# Patient Record
(demographics unavailable — no encounter records)

---

## 2024-10-10 NOTE — HISTORY OF PRESENT ILLNESS
[FreeTextEntry1] : WHITNEY QUIROGA is a 58 yo woman with PMH of pancreatic adenocarcinoma (diagnosed in 05/2023 and stopped chemo 3 months, at [facility]) and opioid use disorder on methadone (15 mg qd)  who presents for initial palliative consultation. Patient is accompanied by her daughter who provided collateral history.   Hospital Course ([facility] 9/20/24-10/3/24): -presented with abdominal distention and pain for the past few weeks -The patient primarily received her cancer care at [facility], where she was recently admitted for transudative ascites s/p LVP, NGT placement and GI stenting for gastric outlet obstruction. The patient left AMA after discomfort with the NGT and wanted to be closer to family in [place].  -Pt admitted with abdominal pain 2/2 pancreatic ca -CT abd showed large ascites and pancreatic mass, s/p paracentesis on 9/23 w/ 4.8 L removed -On 9/27, pleurx cath placed -Pt is confirmed DNR/DNI -Has R chest port that was accessed during admission, deaccessed on 10/3. Pt planning for DC home with A and home hospice  Today patient states that abdominal pain is severe and rates it as 8/10. States that pain is constant and that pain medications do not provide relief. Currently on methadone 15mg qd (for OUD, follows with methadone clinic) and morphine 7.5mg q4hs prn. Takes this about 3 times per day but states that it does not relieve pain. The only current alleviating factor is when she drains pleurX catheter. States that this helps to relieve some pressure. Currently draining ~ 700ml once daily.   Patient does have some nausea. States that she has been taking zofran 4 mg q8hrs prn with relief. No recent vomiting episodes. Very poor appetite.   Patient previously prescribed Klonopin. She is no longer taking this. Currently ordered for hydroxizine. Daughter feels that anxiety has been stable. She is having regular BMs on senna and miralax.  Patient is currently taking mirtazapine 15 mg in the morning and trazodone 50mg qhs. Also occasionally taking melatonin.   Patient has pancreatic cancer with malignant ascites and is no longer being offered treatment. Transitioning to hospice was discussed during her hospitalization but daughter is hesitant about this because of HHA coverage. Daughter is an NP at [facility] and feels capable in providing patient with adequate care at home with the help of HHAs. States that goal is to use palliative services to help with symptom management and if/when patient starts to transition, she will pursue admission to the PCU at [facility].

## 2024-10-10 NOTE — HISTORY OF PRESENT ILLNESS
[FreeTextEntry1] : WHITNEY QUIROGA is a 60 yo woman with PMH of pancreatic adenocarcinoma (diagnosed in 05/2023 and stopped chemo 3 months, at [facility]) and opioid use disorder on methadone (15 mg qd)  who presents for initial palliative consultation. Patient is accompanied by her daughter who provided collateral history.   Hospital Course ([facility] 9/20/24-10/3/24): -presented with abdominal distention and pain for the past few weeks -The patient primarily received her cancer care at [facility], where she was recently admitted for transudative ascites s/p LVP, NGT placement and GI stenting for gastric outlet obstruction. The patient left AMA after discomfort with the NGT and wanted to be closer to family in [place].  -Pt admitted with abdominal pain 2/2 pancreatic ca -CT abd showed large ascites and pancreatic mass, s/p paracentesis on 9/23 w/ 4.8 L removed -On 9/27, pleurx cath placed -Pt is confirmed DNR/DNI -Has R chest port that was accessed during admission, deaccessed on 10/3. Pt planning for DC home with A and home hospice  Today patient states that abdominal pain is severe and rates it as 8/10. States that pain is constant and that pain medications do not provide relief. Currently on methadone 15mg qd (for OUD, follows with methadone clinic) and morphine 7.5mg q4hs prn. Takes this about 3 times per day but states that it does not relieve pain. The only current alleviating factor is when she drains pleurX catheter. States that this helps to relieve some pressure. Currently draining ~ 700ml once daily.   Patient does have some nausea. States that she has been taking zofran 4 mg q8hrs prn with relief. No recent vomiting episodes. Very poor appetite.   Patient previously prescribed Klonopin. She is no longer taking this. Currently ordered for hydroxizine. Daughter feels that anxiety has been stable. She is having regular BMs on senna and miralax.  Patient is currently taking mirtazapine 15 mg in the morning and trazodone 50mg qhs. Also occasionally taking melatonin.   Patient has pancreatic cancer with malignant ascites and is no longer being offered treatment. Transitioning to hospice was discussed during her hospitalization but daughter is hesitant about this because of HHA coverage. Daughter is an NP at [facility] and feels capable in providing patient with adequate care at home with the help of HHAs. States that goal is to use palliative services to help with symptom management and if/when patient starts to transition, she will pursue admission to the PCU at [facility].

## 2024-10-10 NOTE — PHYSICAL EXAM
[] : no respiratory distress [Respiration, Rhythm And Depth] : normal respiratory rhythm and effort [Exaggerated Use Of Accessory Muscles For Inspiration] : no accessory muscle use [Auscultation Breath Sounds / Voice Sounds] : lungs were clear to auscultation bilaterally [Apical Impulse] : the apical impulse was normal [Heart Rate And Rhythm] : heart rate was normal and rhythm regular [Heart Sounds] : normal S1 and S2 [Heart Sounds Gallop] : no gallops [Murmurs] : no murmurs [Heart Sounds Pericardial Friction Rub] : no pericardial rub [Abdomen Soft] : soft [Abdomen Tenderness] : non-tender [No Focal Deficits] : no focal deficits [Sclera] : the sclera and conjunctiva were normal [Outer Ear] : the ears and nose were normal in appearance [Edema] : there was no peripheral edema [FreeTextEntry1] : appears sad, uncomfortable

## 2024-10-10 NOTE — REVIEW OF SYSTEMS
[Feeling Poorly] : feeling poorly [Feeling Tired] : feeling tired [Abdominal Pain] : abdominal pain [As Noted in HPI] : as noted in HPI [Fever] : no fever [Chills] : no chills [Discharge From Eyes] : no purulent discharge from the eyes [Nasal Discharge] : no nasal discharge [Sore Throat] : no sore throat [Chest Pain] : no chest pain [Palpitations] : no palpitations [Shortness Of Breath] : no shortness of breath [Wheezing] : no wheezing [Cough] : no cough [SOB on Exertion] : no shortness of breath during exertion [Vomiting] : no vomiting [Constipation] : no constipation [Diarrhea] : no diarrhea [Dysuria] : no dysuria [Limb Pain] : no limb pain [Skin Wound] : no skin wound [Dizziness] : no dizziness

## 2024-10-10 NOTE — ASSESSMENT
[FreeTextEntry1] : follow up telephonic visit on Tuesday; available earlier prdaniel Coronel, ANALIAP-BC